# Patient Record
Sex: MALE | Race: WHITE | NOT HISPANIC OR LATINO | Employment: FULL TIME | URBAN - NONMETROPOLITAN AREA
[De-identification: names, ages, dates, MRNs, and addresses within clinical notes are randomized per-mention and may not be internally consistent; named-entity substitution may affect disease eponyms.]

---

## 2021-09-19 PROCEDURE — U0004 COV-19 TEST NON-CDC HGH THRU: HCPCS | Performed by: NURSE PRACTITIONER

## 2021-09-20 ENCOUNTER — LAB (OUTPATIENT)
Dept: LAB | Facility: OTHER | Age: 21
End: 2021-09-20

## 2021-11-14 ENCOUNTER — APPOINTMENT (OUTPATIENT)
Dept: ULTRASOUND IMAGING | Facility: HOSPITAL | Age: 21
End: 2021-11-14

## 2021-11-14 ENCOUNTER — HOSPITAL ENCOUNTER (EMERGENCY)
Facility: HOSPITAL | Age: 21
Discharge: HOME | End: 2021-11-14
Attending: FAMILY MEDICINE | Admitting: FAMILY MEDICINE

## 2021-11-14 ENCOUNTER — NURSE TRIAGE (OUTPATIENT)
Dept: CALL CENTER | Facility: HOSPITAL | Age: 21
End: 2021-11-14

## 2021-11-14 VITALS
RESPIRATION RATE: 20 BRPM | WEIGHT: 210 LBS | BODY MASS INDEX: 30.06 KG/M2 | SYSTOLIC BLOOD PRESSURE: 131 MMHG | OXYGEN SATURATION: 99 % | HEART RATE: 70 BPM | HEIGHT: 70 IN | DIASTOLIC BLOOD PRESSURE: 59 MMHG | TEMPERATURE: 98.8 F

## 2021-11-14 DIAGNOSIS — N45.1 EPIDIDYMITIS, LEFT: Primary | ICD-10-CM

## 2021-11-14 LAB
BACTERIA UR QL AUTO: ABNORMAL /HPF
BILIRUB UR QL STRIP: NEGATIVE
CLARITY UR: CLEAR
COLOR UR: YELLOW
GLUCOSE UR STRIP-MCNC: NEGATIVE MG/DL
HGB UR QL STRIP.AUTO: ABNORMAL
HYALINE CASTS UR QL AUTO: ABNORMAL /LPF
KETONES UR QL STRIP: NEGATIVE
LEUKOCYTE ESTERASE UR QL STRIP.AUTO: NEGATIVE
NITRITE UR QL STRIP: NEGATIVE
PH UR STRIP.AUTO: 7 [PH] (ref 5–9)
PROT UR QL STRIP: NEGATIVE
RBC # UR: ABNORMAL /HPF
REF LAB TEST METHOD: ABNORMAL
SP GR UR STRIP: 1.02 (ref 1–1.03)
SQUAMOUS #/AREA URNS HPF: ABNORMAL /HPF
UROBILINOGEN UR QL STRIP: ABNORMAL
WBC UR QL AUTO: ABNORMAL /HPF

## 2021-11-14 PROCEDURE — 93976 VASCULAR STUDY: CPT

## 2021-11-14 PROCEDURE — 81001 URINALYSIS AUTO W/SCOPE: CPT | Performed by: FAMILY MEDICINE

## 2021-11-14 PROCEDURE — 76870 US EXAM SCROTUM: CPT

## 2021-11-14 PROCEDURE — 99283 EMERGENCY DEPT VISIT LOW MDM: CPT

## 2021-11-14 RX ORDER — CIPROFLOXACIN 500 MG/1
500 TABLET, FILM COATED ORAL 2 TIMES DAILY
Qty: 14 TABLET | Refills: 0 | Status: SHIPPED | OUTPATIENT
Start: 2021-11-14 | End: 2022-04-18

## 2021-11-14 NOTE — TELEPHONE ENCOUNTER
"He was asleep on the couch and awoke about 3 am. He has been have left testicle pain since them. The left testicle is hurting. He is not having any pain with urination. Advised per care advice been seen this am in the ED.     Reason for Disposition  • [1] Constant pain in scrotum or testicle AND [2] present > 1 hour    Additional Information  • Negative: [1] Rash or color change of scrotum BUT [2] no swelling or pain  • Negative: Inguinal hernia previously diagnosed by a physician  • Negative: Followed a genital area injury (e.g., penis, scrotum)  • Negative: Swelling of scrotum is main symptom  • Negative: SEVERE pain (e.g., excruciating)  • Negative: Swollen scrotum    Answer Assessment - Initial Assessment Questions  1. LOCATION and RADIATION: \"Where is the pain located?\"       Left testicle   2. QUALITY: \"What does the pain feel like?\"  (e.g., sharp, dull, aching, burning)      Dull ache   3. SEVERITY: \"How bad is the pain?\"  (Scale 1-10; or mild, moderate, severe)    - MILD (1-3): doesn't interfere with normal activities     - MODERATE (4-7): interferes with normal activities (e.g., work or school) or awakens from sleep    - SEVERE (8-10): excruciating pain, unable to do any normal activities, difficulty walking      6  4. ONSET: \"When did the pain start?\"      3am on 11/14/2021   5. PATTERN: \"Does it come and go, or has it been constant since it started?\"      It is constant.   6. SCROTAL APPEARANCE: \"What does the scrotum look like?\" \"Is there any swelling or redness?\"       It does not appear swollen or red.   7. HERNIA: \"Has a doctor ever told you that you have a hernia?\"      no  8. OTHER SYMPTOMS: \"Do you have any other symptoms?\" (e.g., fever, abdominal pain, vomiting, difficulty passing urine)      Pain    Protocols used: SCROTAL PAIN-ADULT-AH      "

## 2021-11-14 NOTE — ED PROVIDER NOTES
"Subjective     History provided by:  Patient   used: No    Patient is a 20 years old who presented here today because of testicular pain on the left that started early this morning.  He said that the pain is there all the time.  Denies any fever chills or sweating.  Any dysuria or any discharge.    Review of Systems   Genitourinary: Positive for testicular pain.   All other systems reviewed and are negative.      Past Medical History:   Diagnosis Date   • Headache    • Otitis media    • Strep throat        No Known Allergies    Past Surgical History:   Procedure Laterality Date   • FRACTURE SURGERY     • LEG SURGERY Right 2014       Family History   Problem Relation Age of Onset   • No Known Problems Mother    • No Known Problems Father        Social History     Socioeconomic History   • Marital status: Single   Tobacco Use   • Smoking status: Never Smoker   • Smokeless tobacco: Never Used         /77 (BP Location: Right arm, Patient Position: Sitting)   Pulse 83   Temp 98.8 °F (37.1 °C) (Oral)   Resp 18   Ht 177.8 cm (70\")   Wt 95.3 kg (210 lb)   SpO2 100%   BMI 30.13 kg/m²   Objective   Physical Exam  Vitals and nursing note reviewed.   Constitutional:       Appearance: Normal appearance. He is obese.   HENT:      Head: Normocephalic and atraumatic.      Right Ear: Tympanic membrane, ear canal and external ear normal.      Left Ear: Tympanic membrane, ear canal and external ear normal.      Nose: Nose normal.   Eyes:      Extraocular Movements: Extraocular movements intact.      Conjunctiva/sclera: Conjunctivae normal.      Pupils: Pupils are equal, round, and reactive to light.   Cardiovascular:      Rate and Rhythm: Normal rate and regular rhythm.      Pulses: Normal pulses.      Heart sounds: Normal heart sounds.   Pulmonary:      Effort: Pulmonary effort is normal.      Breath sounds: Normal breath sounds.   Abdominal:      General: Abdomen is flat. Bowel sounds are normal.     "  Palpations: Abdomen is soft.   Genitourinary:     Testes:         Left: Tenderness present.       Musculoskeletal:         General: Normal range of motion.      Cervical back: Normal range of motion and neck supple.   Skin:     General: Skin is warm.      Capillary Refill: Capillary refill takes less than 2 seconds.   Neurological:      General: No focal deficit present.      Mental Status: He is alert and oriented to person, place, and time.   Psychiatric:         Mood and Affect: Mood normal.         Behavior: Behavior normal.         Thought Content: Thought content normal.         Judgment: Judgment normal.         Procedures           ED Course  ED Course as of 11/14/21 1009   Sun Nov 14, 2021   1007 Result discussed with patient.  Told to follow with primary care in 3 days.  Come back to the ER symptoms get worse. [MO]      ED Course User Index  [MO] Foster Farias MD           Labs Reviewed   URINALYSIS W/ MICROSCOPIC IF INDICATED (NO CULTURE) - Abnormal; Notable for the following components:       Result Value    Blood, UA Trace (*)     All other components within normal limits   URINALYSIS, MICROSCOPIC ONLY - Abnormal; Notable for the following components:    RBC, UA 0-2 (*)     All other components within normal limits       US Testicular or Ovarian Vascular Limited   Final Result   Left epididymitis.      Preserved testicular vascular waveforms.      Electronically signed by:  Fanny Khan MD  11/14/2021   10:06 AM CST Workstation: Listen [id number]      US Scrotum & Testicles   Final Result   Left epididymitis.      Preserved testicular vascular waveforms.      Electronically signed by:  Fanny Khan MD  11/14/2021   10:06 AM CST Workstation: Listen [id number]                                      [facility]    Final diagnoses:   Epididymitis, left       ED Disposition  ED Disposition     ED Disposition Condition Comment    Discharge Stable           Provider, No Known  [facility]  [facility] [id number]    In 3 days           Medication List      New Prescriptions    ciprofloxacin 500 MG tablet  Commonly known as: CIPRO  Take 1 tablet by mouth 2 (Two) Times a Day.        Stop    brompheniramine-pseudoephedrine-DM 30-2-10 MG/5ML syrup  Commonly known as: Bromfed DM           Where to Get Your Medications      These medications were sent to [id number] - [address] AT [facility] [phone] [facility] [phone] [address] [address]    Phone: [phone]   · ciprofloxacin 500 MG tablet          Foster Farias MD  11/14/21 0700

## 2022-07-20 ENCOUNTER — LAB (OUTPATIENT)
Dept: LAB | Facility: OTHER | Age: 22
End: 2022-07-20

## 2022-07-20 PROCEDURE — 87635 SARS-COV-2 COVID-19 AMP PRB: CPT | Performed by: PHYSICIAN ASSISTANT
